# Patient Record
Sex: FEMALE | Race: WHITE | URBAN - METROPOLITAN AREA
[De-identification: names, ages, dates, MRNs, and addresses within clinical notes are randomized per-mention and may not be internally consistent; named-entity substitution may affect disease eponyms.]

---

## 2014-09-16 LAB — PAP SMEAR - HIM PATIENT REPORTED: NEGATIVE

## 2017-11-22 LAB
ALT SERPL-CCNC: 45 U/L (ref 10–48)
AST SERPL-CCNC: 29 U/L (ref 10–43)
CHOLEST SERPL-MCNC: 192 MG/DL (ref 0–200)
CREAT SERPL-MCNC: 0.8 MG/DL (ref 0.6–1.5)
GFR SERPL CREATININE-BSD FRML MDRD: 87.4 ML/MIN/1.73M2 (ref 60–240)
GLUCOSE SERPL-MCNC: 95 MG/DL (ref 70–110)
HBA1C MFR BLD: 5.1 % (ref 4–6)
HDLC SERPL-MCNC: 35 MG/DL (ref 40–60)
LDLC SERPL CALC-MCNC: 131.4 MG/DL (ref 0–131)
POTASSIUM SERPL-SCNC: 3.9 MMOL/L (ref 3.5–5.5)
TRIGL SERPL-MCNC: 128 MG/DL (ref 0–149)
TSH SERPL-ACNC: 5.22 UIU/ML (ref 0.35–5.5)

## 2017-12-19 ENCOUNTER — TRANSFERRED RECORDS (OUTPATIENT)
Dept: HEALTH INFORMATION MANAGEMENT | Facility: CLINIC | Age: 27
End: 2017-12-19

## 2018-01-23 ENCOUNTER — TRANSFERRED RECORDS (OUTPATIENT)
Dept: HEALTH INFORMATION MANAGEMENT | Facility: CLINIC | Age: 28
End: 2018-01-23

## 2018-01-23 LAB — PAP-ABSTRACT: NORMAL

## 2018-10-19 ENCOUNTER — OFFICE VISIT (OUTPATIENT)
Dept: FAMILY MEDICINE | Facility: CLINIC | Age: 28
End: 2018-10-19
Payer: COMMERCIAL

## 2018-10-19 VITALS
OXYGEN SATURATION: 97 % | DIASTOLIC BLOOD PRESSURE: 72 MMHG | RESPIRATION RATE: 16 BRPM | TEMPERATURE: 97.9 F | HEIGHT: 65 IN | WEIGHT: 209.2 LBS | HEART RATE: 107 BPM | SYSTOLIC BLOOD PRESSURE: 112 MMHG | BODY MASS INDEX: 34.85 KG/M2

## 2018-10-19 DIAGNOSIS — F17.200 SMOKER: ICD-10-CM

## 2018-10-19 DIAGNOSIS — Z30.09 ENCOUNTER FOR OTHER GENERAL COUNSELING OR ADVICE ON CONTRACEPTION: ICD-10-CM

## 2018-10-19 DIAGNOSIS — E66.811 CLASS 1 OBESITY WITHOUT SERIOUS COMORBIDITY WITH BODY MASS INDEX (BMI) OF 34.0 TO 34.9 IN ADULT, UNSPECIFIED OBESITY TYPE: ICD-10-CM

## 2018-10-19 DIAGNOSIS — Z97.5 IUD (INTRAUTERINE DEVICE) IN PLACE: Primary | ICD-10-CM

## 2018-10-19 PROBLEM — Z13.6 CARDIOVASCULAR SCREENING; LDL GOAL LESS THAN 160: Status: ACTIVE | Noted: 2018-10-19

## 2018-10-19 PROCEDURE — 99203 OFFICE O/P NEW LOW 30 MIN: CPT | Performed by: FAMILY MEDICINE

## 2018-10-19 NOTE — MR AVS SNAPSHOT
After Visit Summary   10/19/2018    Anamaria Gregorio    MRN: [id number]           Patient Information     Date Of Birth          1990        Visit Information        Provider Department      10/19/2018 1:30 PM Kaykay Blas MD [facility]        Today's Diagnoses     IUD (intrauterine device) in place    -  1    Encounter for other general counseling or advice on contraception        NO ACTIVE PROBLEMS           Follow-ups after your visit        Additional Services     OB/GYN REFERRAL       Your provider [place] referred you to:  [place] [facility] [phone]   [email or web address]      Please be aware that coverage of these services is subject to the terms and limitations of your health insurance plan.  Call member services at your health plan with any benefit or coverage questions.      Please bring the following with you to your appointment:    (1) Any X-Rays, CTs or MRIs which have been performed.  Contact the facility where they were done to arrange for  prior to your scheduled appointment.   (2) List of current medications   (3) This referral request   (4) Any documents/labs given to you for this referral                  Follow-up notes from your care team     Return in about 1 month (around 11/19/2018) for with Gynecology.      Who to contact     If you have questions or need follow up information about today's clinic visit or your schedule please contact [facility] directly at [phone].  Normal or non-critical lab and imaging results will be communicated to you by MyChart, letter or phone within 4 business days after the clinic has received the results. If you do not hear from us within 7 days, please contact the clinic through MyChart or phone. If you have a critical or abnormal lab result, we will notify you by phone as soon as possible.  Submit refill requests through Melintahart or call your  "pharmacy and they will forward the refill request to us. Please allow 3 business days for your refill to be completed.          Additional Information About Your Visit        MyChart Information     handsomexcutive lets you send messages to your doctor, view your test results, renew your prescriptions, schedule appointments and more. To sign up, go to [email or web address] . Click on \"Log in\" on the left side of the screen, which will take you to the Welcome page. Then click on \"Sign up Now\" on the right side of the page.     You will be asked to enter the access code listed below, as well as some personal information. Please follow the directions to create your username and password.     Your access code is: [email or web address]  Expires: 2019  2:07 PM     Your access code will  in 90 days. If you need help or a new code, please call your [place] clinic or [phone].        Care EveryWhere ID     This is your [facility] EveryWhere ID. This could be used by other organizations to access your [place] medical records  [id number]        Your Vitals Were     Pulse Temperature Respirations Height Pulse Oximetry BMI (Body Mass Index)    107 97.9  F (36.6  C) (Oral) 16 5' 5.25\" (1.657 m) 97% 34.55 kg/m2       Blood Pressure from Last 3 Encounters:   10/19/18 112/72    Weight from Last 3 Encounters:   10/19/18 209 lb 3.2 oz (94.9 kg)              We Performed the Following     OB/GYN REFERRAL        Primary Care Provider Office Phone # Fax #    Kaykay Blas [phone] [address] [facility] [address]        Equal Access to Services     [facility]: Hadii jorge Tran, waaxda luqadaha, qaybta berniealanoop milton, suhas parikh . So [facility] [phone].    ATENCIÓN: Si habla español, tiene a houser disposición servicios gratuitos de asistencia lingüística. Llame al [phone].    We comply with applicable federal civil rights laws and [place] laws. We do not " discriminate on the basis of race, color, national origin, age, disability, sex, sexual orientation, or gender identity.            Thank you!     Thank you for choosing [facility]  for your care. Our goal is always to provide you with excellent care. Hearing back from our patients is one way we can continue to improve our services. Please take a few minutes to complete the written survey that you may receive in the mail after your visit with us. Thank you!             Your Updated Medication List - Protect others around you: Learn how to safely use, store and throw away your medicines at www.disposemymeds.org.          This list is accurate as of 10/19/18  2:07 PM.  Always use your most recent med list.                   Brand Name Dispense Instructions for use Diagnosis    MIRENA (52 MG) 20 MCG/24HR IUD   Generic drug:  levonorgestrel      1 each by Intrauterine route once        PHENTERMINE HCL PO      Take by mouth daily

## 2018-10-19 NOTE — PROGRESS NOTES
SUBJECTIVE:   Anamaria Gregorio is a 28 year old female who presents to clinic today for the following health issues:      New Patient/Transfer of Care        Problem list and histories reviewed & adjusted, as indicated.  Additional history:     See under ROS     Patient Active Problem List   Diagnosis     CARDIOVASCULAR SCREENING; LDL GOAL LESS THAN 160      body mass index (BMI) of 34.0 to 34.9 in adult     Smoker     Past Medical History:   Diagnosis Date     NO ACTIVE PROBLEMS      Past Surgical History:   Procedure Laterality Date     TONSILLECTOMY  2012         Current Outpatient Prescriptions   Medication Sig Dispense Refill     levonorgestrel (MIRENA, 52 MG,) 20 MCG/24HR IUD 1 each by Intrauterine route once       PHENTERMINE HCL PO Take by mouth daily       Family History   Problem Relation Age of Onset     No Known Problems Mother      No Known Problems Father      Diabetes Paternal Grandmother      Bladder Cancer Paternal Grandfather      HEART DISEASE Paternal Grandfather      Social History   Substance Use Topics     Smoking status: Current Every Day Smoker     Packs/day: 0.50     Years: 5.00     Types: Cigarettes     Smokeless tobacco: Never Used     Alcohol use Yes      Comment: rarely         Reviewed and updated as needed this visit by clinical staff  Tobacco  Allergies  Med Hx  Surg Hx  Fam Hx  Soc Hx      Reviewed and updated as needed this visit by Provider         ROS:  Here with mary.    CONSTITUTIONAL: recently started working on losing weight  CV: NEGATIVE for chest pain, palpitations or peripheral edema  : has not had much in the way of bleeding over the last several years, but getting a little more now; see below.  PSYCHIATRIC: NEGATIVE for changes in mood or affect    Recently moved here from [place]. Engaged to be  next October.   Would like to get established.    Has Mirena.  Believes will have been in 5 years this November. Notes due to come out.   She notes they did cut the  "string short; so she is a little concerned/worried about getting it removed.    Not wanting another one.  Engaged; will get  in October.   Thinking might attempt pregnancy 6 months or so after that.   For same reason, does not want implant.  Does not want pills, notes some difficulty with taking daily, but also in [place], they would only give one month at a time and she had to travel a ways to pick it up.     Would like to quit smoking.    Would like to lose weight.  In the last couple weeks, has been working on this with her fiance.   Eating healthier. Portion control. Exercise.  Quit pop.         OBJECTIVE:     /72 (BP Location: Right arm, Cuff Size: Adult Large)  Pulse 107  Temp 97.9  F (36.6  C) (Oral)  Resp 16  Ht 5' 5.25\" (1.657 m)  Wt 209 lb 3.2 oz (94.9 kg)  SpO2 97%  BMI 34.55 kg/m2  Body mass index is 34.55 kg/(m^2).  GENERAL APPEARANCE: alert and no distress  CV: regular rates and rhythm  MS: extremities normal- no gross deformities noted  PSYCH: mentation appears normal and affect normal/bright        ASSESSMENT/PLAN:     IUD (intrauterine device) in place  Anticipates removal. She is nervous about it. Notes the string was cut short. Will refer to GYN  - OB/GYN REFERRAL    Encounter for other general counseling or advice on contraception  Reviewed other options for contraception. She notes she does not want to replace IUD as she may want to be open to pregnancy in the next couple years.   She asked about Depo; did discussed there can be delayed fertility with this. She asked about the patch; did discuss.   Discussed the pill. Discussed increased risk of blood clots.  Some of her prior reasons for not wanting the pill would not be a problem here.   She will consider.   - OB/GYN REFERRAL     body mass index (BMI) of 34.0 to 34.9 in adult  She is working on weight loss.  Discussed lifestyle measures with both she and her fiance.  Would be healthier to get weight down prior to " pregnancy.    Smoker  Encourage smoking cessation. Would encourage this prior to pregnancy as well.      Kaykay Blas MD, MD  [facility]

## 2018-10-24 ENCOUNTER — HEALTH MAINTENANCE LETTER (OUTPATIENT)
Age: 28
End: 2018-10-24

## 2018-11-05 ENCOUNTER — DOCUMENTATION ONLY (OUTPATIENT)
Dept: FAMILY MEDICINE | Facility: CLINIC | Age: 28
End: 2018-11-05

## 2018-11-08 ENCOUNTER — OFFICE VISIT (OUTPATIENT)
Dept: OBGYN | Facility: CLINIC | Age: 28
End: 2018-11-08
Payer: COMMERCIAL

## 2018-11-08 VITALS — BODY MASS INDEX: 34.4 KG/M2 | SYSTOLIC BLOOD PRESSURE: 114 MMHG | WEIGHT: 208.3 LBS | DIASTOLIC BLOOD PRESSURE: 76 MMHG

## 2018-11-08 DIAGNOSIS — Z30.432 ENCOUNTER FOR IUD REMOVAL: ICD-10-CM

## 2018-11-08 DIAGNOSIS — Z30.430 ENCOUNTER FOR IUD INSERTION: Primary | ICD-10-CM

## 2018-11-08 DIAGNOSIS — Z30.430 ENCOUNTER FOR IUD INSERTION: ICD-10-CM

## 2018-11-08 PROCEDURE — 58300 INSERT INTRAUTERINE DEVICE: CPT | Performed by: OBSTETRICS & GYNECOLOGY

## 2018-11-08 PROCEDURE — 58301 REMOVE INTRAUTERINE DEVICE: CPT | Performed by: OBSTETRICS & GYNECOLOGY

## 2018-11-08 NOTE — PROGRESS NOTES
INDICATIONS:                                                      Anamaria Gregorio is a 28 year old female,, No LMP recorded. who presents today for mirena  IUD removal and reinsertion. Her current IUD was placed 5 ago. She has not had problems with the IUD. She requests removal of the IUD because the IUD effectiveness has  and would like reinsertion today. The risks, benefits and alternatives of IUD insertion were discussed in detail previously.  She has elected to go ahead with the insertion  today and her questions were answered. Consent was signed. A pregnancy test was not performed today due to continued use of effective contraception.    Past Medical History:   Diagnosis Date     NO ACTIVE PROBLEMS       Past Surgical History:   Procedure Laterality Date     TONSILLECTOMY          ROS:  Const: no weight change, fatigue  : no dysmenorrhea, menorrhagia, intermenstrual bleeding    PROCEDURE:                                                    /76 (BP Location: Left arm, Patient Position: Chair, Cuff Size: Adult Large)  Wt 208 lb 4.8 oz (94.5 kg)  Breastfeeding? Yes  BMI 34.4 kg/m2   The pelvic exam revealed normal external genitalia. On bimanual exam the uterus was Retroverted and normal in size with no tenderness present. The very tip of the IUD strings were palpated at the cervix. A speculum was inserted into the vagina and the cervix was visualized. The strings were not clearly visible at the external cervical os and the cervix was swept with betadine. The strings were then swept down with an endocervical brush and grasped with a packing forceps and the IUD was removed intact. The anterior lip of the cervix was grasped with a single toothed tenaculum. The uterus sounded to 9 cm. A Mirena IUD was then inserted without difficulty. The string was cut to 2 cm.  The patient experienced a mild amount of cramping.    POST PROCEDURE:                                                     Anamaria Gregorio is a 28 year old female here for removal and insertion of IUD.  She  tolerated the procedure well. There were no complications. Patient was discharged in stable condition.    Return to clinic in 4-5 weeks for IUD check.  No need for back up contraception given continuous IUD use. Call if severe cramping, fever, abnormal bleeding, abnormal discharge or pelvic pain develop.  Patient was counseled about the chance of irregular bleeding.    Mana Lucas MD

## 2018-11-08 NOTE — NURSING NOTE
"Chief Complaint   Patient presents with     IUD     Patient has Mirena IUD that is due to be removed. She had placed 11/2013. No problems with IUD and is interested in discussing having new one placed, but she is getting  in a year and will be trying to concieve.       Initial /76 (BP Location: Left arm, Patient Position: Chair, Cuff Size: Adult Large)  Wt 208 lb 4.8 oz (94.5 kg)  Breastfeeding? Yes  BMI 34.4 kg/m2 Estimated body mass index is 34.4 kg/(m^2) as calculated from the following:    Height as of 10/19/18: 5' 5.25\" (1.657 m).    Weight as of this encounter: 208 lb 4.8 oz (94.5 kg).  BP completed using cuff size: large    Questioned patient about current smoking habits.  Pt. currently smokes.  Advised about smoking cessation.      No obstetric history on file.    The following HM Due: Pap smear, patient states she has had pap within the past 2 years, unsure of the date- she recently moved from [place]. She did complete RATNA at previous appt so records could be sent.   Natali Duron CMA                   "

## 2018-11-08 NOTE — MR AVS SNAPSHOT
"              After Visit Summary   2018    Anamaria Gregorio    MRN: [id number]           Patient Information     Date Of Birth          1990        Visit Information        Provider Department      2018 9:30 AM Mana Lucas MD [facility]        Today's Diagnoses     Encounter for IUD insertion    -  1    Encounter for IUD removal           Follow-ups after your visit        Who to contact     If you have questions or need follow up information about today's clinic visit or your schedule please contact [facility] directly at [phone].  Normal or non-critical lab and imaging results will be communicated to you by HireWheelhart, letter or phone within 4 business days after the clinic has received the results. If you do not hear from us within 7 days, please contact the clinic through HireWheelhart or phone. If you have a critical or abnormal lab result, we will notify you by phone as soon as possible.  Submit refill requests through [employer]is[employer] or call your pharmacy and they will forward the refill request to us. Please allow 3 business days for your refill to be completed.          Additional Information About Your Visit        MyChart Information     [employer] Media lets you send messages to your doctor, view your test results, renew your prescriptions, schedule appointments and more. To sign up, go to [email or web address] . Click on \"Log in\" on the left side of the screen, which will take you to the Welcome page. Then click on \"Sign up Now\" on the right side of the page.     You will be asked to enter the access code listed below, as well as some personal information. Please follow the directions to create your username and password.     Your access code is: [email or web address]  Expires: 2019  1:07 PM     Your access code will  in 90 days. If you need help or a new code, please call your [facility] or [phone].        Care EveryWhere ID     This is your Care " EveryWhere ID. This could be used by other organizations to access your [place] medical records  [id number]        Your Vitals Were     Breastfeeding? BMI (Body Mass Index)                Yes 34.4 kg/m2           Blood Pressure from Last 3 Encounters:   11/08/18 114/76   10/19/18 112/72    Weight from Last 3 Encounters:   11/08/18 208 lb 4.8 oz (94.5 kg)   10/19/18 209 lb 3.2 oz (94.9 kg)              We Performed the Following     INSERTION INTRAUTERINE DEVICE     REMOVE INTRAUTERINE DEVICE        Primary Care Provider Office Phone # Fax #    Kaykay Blas [phone]       [address]  [facility] [address]        Equal Access to Services     [facility] : Hadii jorge barkleyo Sohilda, waaxda luqadaha, qaybta kaalmada adeegyada, suhas parikh . So [facility] [phone].    ATENCIÓN: Si habla español, tiene a houser disposición servicios gratuitos de asistencia lingüística. Llame al [phone].    We comply with applicable federal civil rights laws and [place] laws. We do not discriminate on the basis of race, color, national origin, age, disability, sex, sexual orientation, or gender identity.            Thank you!     Thank you for choosing [facility]  for your care. Our goal is always to provide you with excellent care. Hearing back from our patients is one way we can continue to improve our services. Please take a few minutes to complete the written survey that you may receive in the mail after your visit with us. Thank you!             Your Updated Medication List - Protect others around you: Learn how to safely use, store and throw away your medicines at www.disposemymeds.org.          This list is accurate as of 11/8/18 10:05 AM.  Always use your most recent med list.                   Brand Name Dispense Instructions for use Diagnosis    MIRENA (52 MG) 20 MCG/24HR IUD   Generic drug:  levonorgestrel      1 each by Intrauterine route once         PHENTERMINE HCL PO      Take by mouth daily